# Patient Record
Sex: MALE | Race: WHITE | NOT HISPANIC OR LATINO
[De-identification: names, ages, dates, MRNs, and addresses within clinical notes are randomized per-mention and may not be internally consistent; named-entity substitution may affect disease eponyms.]

---

## 2018-01-23 ENCOUNTER — TRANSCRIPTION ENCOUNTER (OUTPATIENT)
Age: 33
End: 2018-01-23

## 2019-06-14 ENCOUNTER — TRANSCRIPTION ENCOUNTER (OUTPATIENT)
Age: 34
End: 2019-06-14

## 2024-05-28 ENCOUNTER — EMERGENCY (EMERGENCY)
Facility: HOSPITAL | Age: 39
LOS: 1 days | Discharge: HOME | End: 2024-05-28
Attending: EMERGENCY MEDICINE | Admitting: EMERGENCY MEDICINE
Payer: COMMERCIAL

## 2024-05-28 VITALS
TEMPERATURE: 98 F | HEIGHT: 72 IN | SYSTOLIC BLOOD PRESSURE: 128 MMHG | OXYGEN SATURATION: 96 % | HEART RATE: 134 BPM | DIASTOLIC BLOOD PRESSURE: 87 MMHG | WEIGHT: 255.07 LBS | RESPIRATION RATE: 16 BRPM

## 2024-05-28 VITALS
OXYGEN SATURATION: 100 % | RESPIRATION RATE: 16 BRPM | SYSTOLIC BLOOD PRESSURE: 142 MMHG | DIASTOLIC BLOOD PRESSURE: 71 MMHG | HEART RATE: 96 BPM

## 2024-05-28 LAB
ALBUMIN SERPL ELPH-MCNC: 4.2 G/DL — SIGNIFICANT CHANGE UP (ref 3.4–5)
ALP SERPL-CCNC: 82 U/L — SIGNIFICANT CHANGE UP (ref 40–120)
ALT FLD-CCNC: 28 U/L — SIGNIFICANT CHANGE UP (ref 12–42)
ANION GAP SERPL CALC-SCNC: 13 MMOL/L — SIGNIFICANT CHANGE UP (ref 9–16)
AST SERPL-CCNC: 20 U/L — SIGNIFICANT CHANGE UP (ref 15–37)
BASOPHILS # BLD AUTO: 0.05 K/UL — SIGNIFICANT CHANGE UP (ref 0–0.2)
BASOPHILS NFR BLD AUTO: 0.5 % — SIGNIFICANT CHANGE UP (ref 0–2)
BILIRUB SERPL-MCNC: 0.3 MG/DL — SIGNIFICANT CHANGE UP (ref 0.2–1.2)
BUN SERPL-MCNC: 11 MG/DL — SIGNIFICANT CHANGE UP (ref 7–23)
CALCIUM SERPL-MCNC: 9.5 MG/DL — SIGNIFICANT CHANGE UP (ref 8.5–10.5)
CHLORIDE SERPL-SCNC: 105 MMOL/L — SIGNIFICANT CHANGE UP (ref 96–108)
CO2 SERPL-SCNC: 24 MMOL/L — SIGNIFICANT CHANGE UP (ref 22–31)
CREAT SERPL-MCNC: 1.25 MG/DL — SIGNIFICANT CHANGE UP (ref 0.5–1.3)
EGFR: 76 ML/MIN/1.73M2 — SIGNIFICANT CHANGE UP
EOSINOPHIL # BLD AUTO: 0.12 K/UL — SIGNIFICANT CHANGE UP (ref 0–0.5)
EOSINOPHIL NFR BLD AUTO: 1.1 % — SIGNIFICANT CHANGE UP (ref 0–6)
GLUCOSE SERPL-MCNC: 105 MG/DL — HIGH (ref 70–99)
HCT VFR BLD CALC: 45.8 % — SIGNIFICANT CHANGE UP (ref 39–50)
HGB BLD-MCNC: 15.6 G/DL — SIGNIFICANT CHANGE UP (ref 13–17)
IMM GRANULOCYTES NFR BLD AUTO: 0.5 % — SIGNIFICANT CHANGE UP (ref 0–0.9)
LYMPHOCYTES # BLD AUTO: 1.89 K/UL — SIGNIFICANT CHANGE UP (ref 1–3.3)
LYMPHOCYTES # BLD AUTO: 18 % — SIGNIFICANT CHANGE UP (ref 13–44)
MCHC RBC-ENTMCNC: 29.7 PG — SIGNIFICANT CHANGE UP (ref 27–34)
MCHC RBC-ENTMCNC: 34.1 GM/DL — SIGNIFICANT CHANGE UP (ref 32–36)
MCV RBC AUTO: 87.1 FL — SIGNIFICANT CHANGE UP (ref 80–100)
MONOCYTES # BLD AUTO: 0.59 K/UL — SIGNIFICANT CHANGE UP (ref 0–0.9)
MONOCYTES NFR BLD AUTO: 5.6 % — SIGNIFICANT CHANGE UP (ref 2–14)
NEUTROPHILS # BLD AUTO: 7.78 K/UL — HIGH (ref 1.8–7.4)
NEUTROPHILS NFR BLD AUTO: 74.3 % — SIGNIFICANT CHANGE UP (ref 43–77)
NRBC # BLD: 0 /100 WBCS — SIGNIFICANT CHANGE UP (ref 0–0)
PLATELET # BLD AUTO: 242 K/UL — SIGNIFICANT CHANGE UP (ref 150–400)
POTASSIUM SERPL-MCNC: 4.1 MMOL/L — SIGNIFICANT CHANGE UP (ref 3.5–5.3)
POTASSIUM SERPL-SCNC: 4.1 MMOL/L — SIGNIFICANT CHANGE UP (ref 3.5–5.3)
PROT SERPL-MCNC: 8.3 G/DL — HIGH (ref 6.4–8.2)
RBC # BLD: 5.26 M/UL — SIGNIFICANT CHANGE UP (ref 4.2–5.8)
RBC # FLD: 12.5 % — SIGNIFICANT CHANGE UP (ref 10.3–14.5)
SODIUM SERPL-SCNC: 142 MMOL/L — SIGNIFICANT CHANGE UP (ref 132–145)
TSH SERPL-MCNC: 1.2 UIU/ML — SIGNIFICANT CHANGE UP (ref 0.36–3.74)
WBC # BLD: 10.48 K/UL — SIGNIFICANT CHANGE UP (ref 3.8–10.5)
WBC # FLD AUTO: 10.48 K/UL — SIGNIFICANT CHANGE UP (ref 3.8–10.5)

## 2024-05-28 PROCEDURE — 70450 CT HEAD/BRAIN W/O DYE: CPT | Mod: 26,XU,MC

## 2024-05-28 PROCEDURE — 70496 CT ANGIOGRAPHY HEAD: CPT | Mod: 26,MC

## 2024-05-28 PROCEDURE — 70498 CT ANGIOGRAPHY NECK: CPT | Mod: 26,MC

## 2024-05-28 PROCEDURE — 99285 EMERGENCY DEPT VISIT HI MDM: CPT

## 2024-05-28 RX ORDER — FLUTICASONE PROPIONATE 50 MCG
1 SPRAY, SUSPENSION NASAL
Qty: 1 | Refills: 0
Start: 2024-05-28

## 2024-05-28 RX ORDER — MECLIZINE HCL 12.5 MG
25 TABLET ORAL ONCE
Refills: 0 | Status: COMPLETED | OUTPATIENT
Start: 2024-05-28 | End: 2024-05-28

## 2024-05-28 RX ORDER — MECLIZINE HCL 12.5 MG
1 TABLET ORAL
Qty: 20 | Refills: 0
Start: 2024-05-28

## 2024-05-28 RX ADMIN — Medication 25 MILLIGRAM(S): at 18:04

## 2024-05-28 NOTE — ED ADULT NURSE NOTE - OBJECTIVE STATEMENT
Pt in ED d/t palpitations. Per pt, went to chiropractor x3 in the recent 8 days to get neck adjusted. States after visit, he's been feeling palpitations, dizziness, numbness in both arms, tingling of fingers and toes, and SOB. Regardless whether he's lying down or moving around, symptoms are present intermittently. AOx4, GCS 15.

## 2024-05-28 NOTE — ED ADULT NURSE NOTE - SUICIDE SCREENING QUESTION 3
Medical certificate    1/25/2022      RE:  Kandice Wayne  [address] [place] [id number]      This is to certify that Kandice Wayne has been under my care on 1/25/2022 and is excused from school on 1/19/2022-/1/20/22; 1/24/22-1/27/22.          SIGNATURE:_________________________________________________, 1/25/2022    Christie Sigala MD        [address] [place] [address] [phone]   No
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
## 2024-05-28 NOTE — ED ADULT TRIAGE NOTE - CHIEF COMPLAINT QUOTE
sent from pcp for eval of dizziness, palpations, general weakness and fatigue s/p a neck adjustment by his chiropractor 8 days ago (MATT-)

## 2024-05-28 NOTE — ED PROVIDER NOTE - PROGRESS NOTE DETAILS
Patient is resting comfortably, NAD. Symptoms resolved after meclizine. Will refer to ENT. Also instructed to f/u with his PMD and neurologist. HR remains elevated, . Patient reports his HR is always elevated. He was turned away from giving blood once due to tachycardia and other physicians have hold him his HR was fast. Will send thyroid tests. Return to the ED immediately if getting worse, not improving, or if having any new or troubling symptoms.

## 2024-05-28 NOTE — ED PROVIDER NOTE - PATIENT PORTAL LINK FT
You can access the FollowMyHealth Patient Portal offered by [facility] by registering at the following website: [email or web address]. By joining [facility]’s FollowMyHealth portal, you will also be able to view your health information using other applications (apps) compatible with our system.

## 2024-05-28 NOTE — ED PROVIDER NOTE - NSFOLLOWUPCLINICS_GEN_ALL_ED_FT
[facility]  Otolaryngology (ENT)  [address], [address]  [place] [address]  Phone: [phone]  Fax:   Follow Up Time: 4-6 Days

## 2024-05-28 NOTE — ED PROVIDER NOTE - CLINICAL SUMMARY MEDICAL DECISION MAKING FREE TEXT BOX
Patient with intermittent vertigo starting after chiropractic adjustments. Will get CTA head and neck, reassess.

## 2024-05-28 NOTE — ED PROVIDER NOTE - OBJECTIVE STATEMENT
Patient reports that he has been having vertigo on and off for the past week.  He had 3 chiropractic adjustments last week on 5/19, 5/21, 5/23.  Vertigo began the evening on 5/21 a couple of hours after the adjustments.  Patient has history of Guillain-Barré in 2018 requiring ICU admission and IVIG.  Patient has had intermittent paresthesias in feet and hands since then.  no headache, chest pain, shortness of breath, abdominal pain, nausea, vomiting, focal weakness, new paresthesias. Patient reports that he has been having vertigo on and off for the past week.  He had 3 chiropractic adjustments last week on 5/19, 5/21, 5/23.  Vertigo began the evening on 5/21 a couple of hours after the adjustments. Intermittent, lasts 10-15 minutes at a time. Worse with walking Patient has history of Guillain-Barré in 2018 requiring ICU admission and IVIG.  Patient has had intermittent paresthesias in feet and hands since then.  no headache, chest pain, shortness of breath, abdominal pain, nausea, vomiting, focal weakness, new paresthesias, tinnitus, hearing loss.

## 2024-05-28 NOTE — ED PROVIDER NOTE - PHYSICAL EXAMINATION
Gen: Well-developed, well-nourished, NAD, VS as noted by nursing. HEENT: NCAT, mmm   Chest: RRR, nl S1 and S2, no m/r/g. Resp: CTAB, no w/r/r  Abd: nl BS, soft, nt/nd. Ext: Warm, dry  Neuro: CN II-XII intact, normal and equal strength, sensation, and reflexes bilaterally, normal gait, no drift, speech clear. no ataxia.  Psych: AAOx3

## 2024-05-28 NOTE — ED ADULT NURSE NOTE - NSSEPSISSUSPECTED_ED_A_ED
Telephone Encounter by Nellie Zavala at 04/12/17 04:30 PM     Author:  Nellie Zavala Service:  (none) Author Type:  Patient      Filed:  04/12/17 04:38 PM Encounter Date:  4/12/2017 Status:  Signed     :  Nellie Zavala (Patient )              YUNI ACHARYA    Patient Age: 72 year old   Refill request by:[AE1.1T] Pharmacy fax[AE1.1M]  Refill to be:[AE1.1T] ePrescribed to[AE1.1M] NICOLAS[facility]SAY [address][AE1.2T]    Medication requested to be refilled:[AE1.1T]   Requested Prescriptions     Pending Prescriptions Disp Refills   • amlodipine (NORVASC) 5 MG tablet 90 Tab 0     Sig: Take 1 Tab by mouth daily.[AE1.2T]           Next and Last Visit with Provider and Department  Next visit with MARCELLA PATRICK is on 06/28/2017 at  8:00 AM in [facility]  Next visit with [facility] is on 06/28/2017 at  8:00 AM in [facility]   Last visit with MARCELLA PATRICK was on 03/06/2017 at  8:00 AM in [facility]  Last visit with [facility] was on 03/06/2017 at  8:00 AM in [facility]      WEIGHT AND HEIGHT: As of 03/06/2017 weight is 202 lbs.(91.627 kg).   BMI is 31.47 kg/(m^2) calculated from:     Height 5' 7\" (1.702 m) as of 12/5/16     Weight 201 lb (91.173 kg) as of 12/5/16[AE1.1T]      Allergies      Allergen   Reactions   • Doxazosin  Other - See Comments     Dizziness    • Lisinopril  Other - See Comments     Hyperkalemia    • Nifedipine  Other - See Comments     LLE - D/C'd per Dr. Stafford     • Penicillins  Rash and Anaphylaxis[AE1.2T]     Current outpatient prescriptions       Medication  Sig Dispense Refill   • DIABETIC TESTING METER Select based on [facility], patient or insurance preference. Test once dailyDx: DM TYPE II-UNCOMPL E11.9 1 Each PRN   • Vitamin D, Ergocalciferol, 75336 UNITS CAPS 1 cap daily for 14 days then WEEKLY for 12 weeks 26 Cap 0   • glimepiride (AMARYL) 2 MG tablet Take 1 Tab by mouth every morning  before breakfast. 90 Tab 1   • lisinopril (PRINIVIL,ZESTRIL) 10 MG tablet Take 1 Tab by mouth daily. 90 Tab 0   • simvastatin (ZOCOR) 20 MG tablet TAKE 1 TABLET BY MOUTH NIGHTLY 90 Tab 0   • amlodipine (NORVASC) 5 MG tablet Take 1 Tab by mouth daily. 90 Tab 0   • hydrochlorothiazide (HYDRODIURIL) 25 MG tablet Take 1 Tab by mouth daily. 90 Tab 0   • metformin (GLUCOPHAGE-XR) 750 MG 24 hr tablet Take 2 Tabs by mouth daily with breakfast. 180 Tab 3   • Linagliptin (TRADJENTA) 5 MG TABS Take 1 Tab by mouth daily. 90 Each 1   • metoprolol (TOPROL-XL) 200 MG 24 hr tablet Take 1 Tab by mouth daily. Take 1 Tab by mouth daily.(dose increase 100mg to 200mg per Dr. Stafford- pls remove old Rx) - 90 Tab 1   • aspirin 81 MG tablet Take 81 mg by mouth daily.     • fluticasone (FLONASE) 50 MCG/ACT nasal spray USE 2 SPRAYS IN EACH NOSTRIL DAILY 1 Bottle 10   • Meclizine HCl 25 MG TABS Take 1 Tab by mouth 3 (three) times daily as needed. 30 Tab 0   • tizanidine (ZANAFLEX) 4 MG tablet 1 tab up to TID prn 90 Tab 1   • Loratadine (CLARITIN OR) Take  by mouth.     • DIABETIC TESTING METER every morning before breakfast. Select based on [facility], patient or insurance preference 1 Kit 0   • DIABETIC TESTING STRIP/LANCETS every morning before breakfast. Select based on [facility], patient or insurance preference 50 Strip 1   • PRILOSEC OTC OR 1 tab q 2-3 days          ROUTING:[AE1.1T] Patient's physician/staff[AE1.1M]        PCP: Rocio Landon MD         INS: Payor: MEDICARE - ASSIGNED / Plan: *No Plan* / Product Type: *No Product type* / Note: This is the primary coverage, but no account was found for this location or the patient's primary location.   ADDRESS:  [address] [place] [address] [address] David [place] [id number][AE1.1T]         Revision History        User Key Date/Time User Provider Type Action    > AE1.2 04/12/17 04:38 PM Nellie Zavala Patient  Sign     AE1.1 04/12/17 04:30 PM Nellie Zavala Patient      M - Manual, T -  Template             No

## 2024-05-29 LAB — T4 FREE SERPL-MCNC: 1.3 NG/DL — SIGNIFICANT CHANGE UP (ref 0.9–1.8)

## 2024-05-30 ENCOUNTER — APPOINTMENT (OUTPATIENT)
Dept: OTOLARYNGOLOGY | Facility: CLINIC | Age: 39
End: 2024-05-30

## 2024-05-30 ENCOUNTER — NON-APPOINTMENT (OUTPATIENT)
Age: 39
End: 2024-05-30

## 2024-05-30 ENCOUNTER — APPOINTMENT (OUTPATIENT)
Dept: OTOLARYNGOLOGY | Facility: CLINIC | Age: 39
End: 2024-05-30
Payer: COMMERCIAL

## 2024-05-30 VITALS
SYSTOLIC BLOOD PRESSURE: 127 MMHG | WEIGHT: 255 LBS | HEIGHT: 72 IN | HEART RATE: 126 BPM | DIASTOLIC BLOOD PRESSURE: 111 MMHG | BODY MASS INDEX: 34.54 KG/M2 | TEMPERATURE: 96.9 F

## 2024-05-30 DIAGNOSIS — Z87.2 PERSONAL HISTORY OF DISEASES OF THE SKIN AND SUBCUTANEOUS TISSUE: ICD-10-CM

## 2024-05-30 DIAGNOSIS — H93.13 TINNITUS, BILATERAL: ICD-10-CM

## 2024-05-30 DIAGNOSIS — R00.0 TACHYCARDIA, UNSPECIFIED: ICD-10-CM

## 2024-05-30 DIAGNOSIS — R42 DIZZINESS AND GIDDINESS: ICD-10-CM

## 2024-05-30 DIAGNOSIS — H81.09 MENIERE'S DISEASE, UNSPECIFIED EAR: ICD-10-CM

## 2024-05-30 DIAGNOSIS — Z86.69 PERSONAL HISTORY OF OTHER DISEASES OF THE NERVOUS SYSTEM AND SENSE ORGANS: ICD-10-CM

## 2024-05-30 DIAGNOSIS — R20.2 PARESTHESIA OF SKIN: ICD-10-CM

## 2024-05-30 PROBLEM — Z00.00 ENCOUNTER FOR PREVENTIVE HEALTH EXAMINATION: Status: ACTIVE | Noted: 2024-05-30

## 2024-05-30 PROCEDURE — 92557 COMPREHENSIVE HEARING TEST: CPT

## 2024-05-30 PROCEDURE — 99204 OFFICE O/P NEW MOD 45 MIN: CPT

## 2024-05-30 PROCEDURE — 92550 TYMPANOMETRY & REFLEX THRESH: CPT | Mod: 52

## 2024-05-30 RX ORDER — APREMILAST 30 MG/1
30 TABLET, FILM COATED ORAL
Refills: 0 | Status: ACTIVE | COMMUNITY

## 2024-05-30 RX ORDER — FAMOTIDINE 10 MG/1
TABLET, FILM COATED ORAL
Refills: 0 | Status: ACTIVE | COMMUNITY

## 2024-05-30 RX ORDER — ACETAMINOPHEN 325 MG/1
TABLET, FILM COATED ORAL
Refills: 0 | Status: ACTIVE | COMMUNITY

## 2024-05-30 NOTE — HISTORY OF PRESENT ILLNESS
[de-identified] : Mr. TERRY is a 38-year-old man who was referred by the ED for vertigo, which started after chiropractic manipulation 1 week ago.  He went to [facility] for treatment and had CT scans done.   CT ANGIO BRAIN with and without contrast  CT ANGIO NECK with and without contrast  CT BRAIN (5/28/2024) at [facility] ED:   CT HEAD WITHOUT CONTRAST AND CTA OF THE HEAD AND NECK  Subsequently, following the intravenous injection of non-ionic contrast material, serial thin images were obtained with attention to the cervical and intracranial arterial vasculature.  Multiplanar reconstructions were performed including reformatting using a dedicated 3D software package with viewing on a dedicated workstation in multiple planes.  MIP image analysis was performed on a separate workstation.  CONTRAST: 95 mL Omnipaque 350 was administered. 5 mL was discarded.  COMPARISON: None available at the time of interpretation. __________________ FINDINGS:  NONCONTRAST HEAD CT:  Intracranial Hemorrhage: None.  Infarct/Vascular: No CT evidence of acute infarct.  Intracranial Mass: No evidence of intracranial mass.  CSF Spaces: The ventricles, sulci, and cisterns are normal.  Calvarium and Scalp: Unremarkable.  Paranasal Sinuses, Mastoid Air Cells, and Orbits: Visualized paranasal sinuses are clear. Visualized mastoid air cells clear. Orbits are unremarkable.   CTA HEAD:  Right Anterior Circulation: The right petrous internal carotid artery is patent without stenosis. The right cavernous internal carotid artery is patent without stenosis. The right supraclinoid internal carotid artery is patent without stenosis. The A1 and A2 segments of the right anterior cerebral artery are patent without stenosis. The M1 and M2 segments of the right middle cerebral artery are patent without stenosis.  Left Anterior Circulation: The left petrous internal carotid artery is patent without stenosis. The left cavernous internal carotid artery is patent without stenosis. The left supraclinoid internal carotid artery is patent without stenosis. The A1 and A2 segments of the left anterior cerebral artery are patent without stenosis. The M1 and M2 segments of the left middle cerebral artery are patent without stenosis.  Posterior Circulation: Posteriorly, the distal vertebral arteries are patent without stenosis. The basilar artery is patent without stenosis. The P1 and P2 segments of the posterior cerebral arteries are patent without stenosis.  Aneurysm/Vascular Malformation: No evidence of aneursym or vascular malformation.  Dural Venous Sinuses: The visualized dural venous sinuses are patent without evidence of dural venous sinus thrombosis.  Additional Findings: Very mild mucosal thickening of the maxillary sinuses. ______  CTA NECK:  Right Carotid: The right common carotid artery is patent without stenosis. The right internal carotid artery is patent without stenosis.  Left Carotid: The left common carotid artery is patent without stenosis. The left internal carotid artery is patent without stenosis.  Right Vertebral: The right cervical vertebral artery is patent without stenosis.  Left Vertebral: The left cervical vertebral artery is patent without stenosis.  Arch/Great Vessels: The brachiocephalic and subclavian arteries are patent without high grade stenosis.  Additional Findings: None. __________________ IMPRESSION:  NONCONTRAST HEAD CT: 1.  No acute intracranial abnormality.  CTA HEAD AND NECK: 1.   CTA HEAD: No large vessel occlusion, aneurysm, or hemodynamically significant stenosis. Sinus disease. 2.   CTA NECK: No occlusion, dissection, aneurysm, or hemodynamically significant stenosis.

## 2024-05-31 PROBLEM — Z86.69 PERSONAL HISTORY OF OTHER DISEASES OF THE NERVOUS SYSTEM AND SENSE ORGANS: Chronic | Status: ACTIVE | Noted: 2024-05-28

## 2024-06-11 ENCOUNTER — APPOINTMENT (OUTPATIENT)
Dept: MRI IMAGING | Facility: CLINIC | Age: 39
End: 2024-06-11
Payer: COMMERCIAL

## 2024-06-11 PROCEDURE — 70551 MRI BRAIN STEM W/O DYE: CPT

## 2024-06-21 ENCOUNTER — APPOINTMENT (OUTPATIENT)
Dept: OTOLARYNGOLOGY | Facility: CLINIC | Age: 39
End: 2024-06-21

## 2024-06-21 VITALS
DIASTOLIC BLOOD PRESSURE: 99 MMHG | HEIGHT: 72 IN | SYSTOLIC BLOOD PRESSURE: 117 MMHG | TEMPERATURE: 97.5 F | HEART RATE: 109 BPM | WEIGHT: 250 LBS | BODY MASS INDEX: 33.86 KG/M2

## 2024-06-21 DIAGNOSIS — H65.93 UNSPECIFIED NONSUPPURATIVE OTITIS MEDIA, BILATERAL: ICD-10-CM

## 2024-06-21 DIAGNOSIS — J35.02 CHRONIC ADENOIDITIS: ICD-10-CM

## 2024-06-21 PROCEDURE — 92511 NASOPHARYNGOSCOPY: CPT

## 2024-06-21 PROCEDURE — 99214 OFFICE O/P EST MOD 30 MIN: CPT | Mod: 25

## 2024-06-21 RX ORDER — FLUTICASONE PROPIONATE 50 UG/1
50 SPRAY, METERED NASAL DAILY
Qty: 1 | Refills: 5 | Status: ACTIVE | COMMUNITY
Start: 2024-06-21 | End: 1900-01-01

## 2024-06-21 RX ORDER — PREDNISONE 20 MG/1
20 TABLET ORAL
Qty: 21 | Refills: 0 | Status: COMPLETED | COMMUNITY
Start: 2024-05-30 | End: 2024-06-04

## 2024-06-21 RX ORDER — BUSPIRONE HYDROCHLORIDE 7.5 MG/1
TABLET ORAL
Refills: 0 | Status: ACTIVE | COMMUNITY

## 2024-06-21 RX ORDER — AMOXICILLIN AND CLAVULANATE POTASSIUM 500; 125 MG/1; MG/1
500-125 TABLET, FILM COATED ORAL TWICE DAILY
Qty: 14 | Refills: 0 | Status: ACTIVE | COMMUNITY
Start: 2024-06-21 | End: 1900-01-01

## 2024-07-10 ENCOUNTER — APPOINTMENT (OUTPATIENT)
Dept: OTOLARYNGOLOGY | Facility: CLINIC | Age: 39
End: 2024-07-10

## 2024-07-10 VITALS
TEMPERATURE: 97.2 F | DIASTOLIC BLOOD PRESSURE: 109 MMHG | WEIGHT: 255 LBS | HEIGHT: 72 IN | SYSTOLIC BLOOD PRESSURE: 139 MMHG | BODY MASS INDEX: 34.54 KG/M2 | HEART RATE: 103 BPM

## 2024-07-10 PROBLEM — Z78.9 ALCOHOL USE: Status: ACTIVE | Noted: 2024-07-10

## 2024-07-10 PROBLEM — Z87.81 HISTORY OF FRACTURE OF WRIST: Status: RESOLVED | Noted: 2024-07-10 | Resolved: 2024-07-10

## 2024-07-10 PROBLEM — Z80.9 FAMILY HISTORY OF CANCER: Status: ACTIVE | Noted: 2024-07-10

## 2024-07-10 PROBLEM — J06.9 RECENT URI: Status: ACTIVE | Noted: 2024-07-10 | Resolved: 2024-08-09

## 2024-07-10 PROCEDURE — 99214 OFFICE O/P EST MOD 30 MIN: CPT | Mod: 25

## 2024-07-10 PROCEDURE — 92511 NASOPHARYNGOSCOPY: CPT
